# Patient Record
Sex: MALE | Race: WHITE
[De-identification: names, ages, dates, MRNs, and addresses within clinical notes are randomized per-mention and may not be internally consistent; named-entity substitution may affect disease eponyms.]

---

## 2021-08-28 ENCOUNTER — HOSPITAL ENCOUNTER (EMERGENCY)
Dept: HOSPITAL 50 - VM.ED | Age: 51
Discharge: HOME | End: 2021-08-28
Payer: COMMERCIAL

## 2021-08-28 VITALS — DIASTOLIC BLOOD PRESSURE: 72 MMHG | SYSTOLIC BLOOD PRESSURE: 128 MMHG | HEART RATE: 78 BPM

## 2021-08-28 DIAGNOSIS — E78.00: ICD-10-CM

## 2021-08-28 DIAGNOSIS — E66.9: ICD-10-CM

## 2021-08-28 DIAGNOSIS — Z20.822: ICD-10-CM

## 2021-08-28 DIAGNOSIS — R53.83: Primary | ICD-10-CM

## 2021-08-28 DIAGNOSIS — I10: ICD-10-CM

## 2021-08-28 LAB
ANION GAP SERPL CALC-SCNC: 14.7 MMOL/L (ref 5–15)
CHLORIDE SERPL-SCNC: 102 MMOL/L (ref 98–107)
SODIUM SERPL-SCNC: 139 MMOL/L (ref 136–145)

## 2021-08-28 PROCEDURE — U0002 COVID-19 LAB TEST NON-CDC: HCPCS

## 2021-08-28 NOTE — PCM.EKG
** #1 Interpretation


EKG Date: 08/28/21


Time: 14:11


Rhythm: NSR


Rate (Beats/Min): 75


Axis: Normal


P-Wave: Present


QRS: Normal


ST-T: Normal


QT: Normal


Comparison: NA - No Prior EKG

## 2022-01-13 ENCOUNTER — HOSPITAL ENCOUNTER (OUTPATIENT)
Dept: HOSPITAL 50 - VM.SDS | Age: 52
Discharge: HOME | End: 2022-01-13
Attending: FAMILY MEDICINE
Payer: COMMERCIAL

## 2022-01-13 VITALS — DIASTOLIC BLOOD PRESSURE: 68 MMHG | HEART RATE: 73 BPM | SYSTOLIC BLOOD PRESSURE: 110 MMHG

## 2022-01-13 DIAGNOSIS — M62.830: ICD-10-CM

## 2022-01-13 DIAGNOSIS — I10: ICD-10-CM

## 2022-01-13 DIAGNOSIS — Z98.890: ICD-10-CM

## 2022-01-13 DIAGNOSIS — G47.33: ICD-10-CM

## 2022-01-13 DIAGNOSIS — D12.0: ICD-10-CM

## 2022-01-13 DIAGNOSIS — F32.A: ICD-10-CM

## 2022-01-13 DIAGNOSIS — D12.4: ICD-10-CM

## 2022-01-13 DIAGNOSIS — E66.9: ICD-10-CM

## 2022-01-13 DIAGNOSIS — Z80.0: ICD-10-CM

## 2022-01-13 DIAGNOSIS — E78.5: ICD-10-CM

## 2022-01-13 DIAGNOSIS — Z12.11: Primary | ICD-10-CM

## 2022-01-13 DIAGNOSIS — Z79.899: ICD-10-CM

## 2022-01-13 DIAGNOSIS — F41.9: ICD-10-CM

## 2022-01-13 DIAGNOSIS — Z90.49: ICD-10-CM

## 2022-01-13 PROCEDURE — 00812 ANES LWR INTST SCR COLSC: CPT

## 2022-01-13 PROCEDURE — 45380 COLONOSCOPY AND BIOPSY: CPT

## 2022-08-14 ENCOUNTER — HOSPITAL ENCOUNTER (EMERGENCY)
Dept: HOSPITAL 50 - VM.ED | Age: 52
Discharge: HOME | End: 2022-08-14
Payer: COMMERCIAL

## 2022-08-14 VITALS — DIASTOLIC BLOOD PRESSURE: 76 MMHG | SYSTOLIC BLOOD PRESSURE: 116 MMHG | HEART RATE: 85 BPM

## 2022-08-14 DIAGNOSIS — E66.9: ICD-10-CM
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
## 2022-08-14 DIAGNOSIS — E78.00: ICD-10-CM

## 2022-08-14 DIAGNOSIS — I10: ICD-10-CM

## 2022-08-14 DIAGNOSIS — S46.211A: Primary | ICD-10-CM

## 2022-08-14 DIAGNOSIS — Z79.899: ICD-10-CM

## 2022-08-14 DIAGNOSIS — Z79.82: ICD-10-CM
